# Patient Record
Sex: FEMALE | Race: WHITE | NOT HISPANIC OR LATINO | Employment: FULL TIME | URBAN - NONMETROPOLITAN AREA
[De-identification: names, ages, dates, MRNs, and addresses within clinical notes are randomized per-mention and may not be internally consistent; named-entity substitution may affect disease eponyms.]

---

## 2024-04-12 ENCOUNTER — TELEPHONE (OUTPATIENT)
Dept: CARDIOLOGY | Facility: CLINIC | Age: 29
End: 2024-04-12
Payer: COMMERCIAL

## 2024-04-12 NOTE — TELEPHONE ENCOUNTER
HUB TO RELAY    Called patient to see if she could come in a bit later in the day the day of her appt on 04/18-sometime after 9am per Andrade?    No answer-Left  for call back

## 2024-04-18 ENCOUNTER — OFFICE VISIT (OUTPATIENT)
Dept: CARDIOLOGY | Facility: CLINIC | Age: 29
End: 2024-04-18
Payer: COMMERCIAL

## 2024-04-18 ENCOUNTER — TELEPHONE (OUTPATIENT)
Dept: CARDIOLOGY | Facility: CLINIC | Age: 29
End: 2024-04-18
Payer: COMMERCIAL

## 2024-04-18 VITALS
DIASTOLIC BLOOD PRESSURE: 86 MMHG | WEIGHT: 194.6 LBS | HEART RATE: 87 BPM | HEIGHT: 63 IN | OXYGEN SATURATION: 99 % | BODY MASS INDEX: 34.48 KG/M2 | SYSTOLIC BLOOD PRESSURE: 135 MMHG

## 2024-04-18 DIAGNOSIS — I10 PRIMARY HYPERTENSION: Primary | ICD-10-CM

## 2024-04-18 PROCEDURE — 93000 ELECTROCARDIOGRAM COMPLETE: CPT | Performed by: PHYSICIAN ASSISTANT

## 2024-04-18 PROCEDURE — 99213 OFFICE O/P EST LOW 20 MIN: CPT | Performed by: PHYSICIAN ASSISTANT

## 2024-04-18 NOTE — TELEPHONE ENCOUNTER
Requested.       ----- Message from Andrade Wbeer PA-C sent at 4/18/2024 11:13 AM EDT -----  Can we get labs from pcp?

## 2024-04-18 NOTE — PROGRESS NOTES
Hanane Montes APRN  Radha CLINE  1995 04/18/2024    Patient Active Problem List   Diagnosis    Ureteral stone    Breast cyst, right    Primary hypertension       Dear Hanane Montes APRN:    Subjective     History of Present Illness:    Chief Complaint   Patient presents with    Follow-up     ROUTINE       Radha CLINE is a pleasant 29 y.o. female with a past medical history significant for essential hypertension and pulmonary hypertension although recent echo showed normal pulmonary pressures.  She comes in for annual follow-up    Ms. 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  Clinically she denies any concerning cardiac symptoms today such as chest pains, shortness of breath, palpitations, dizziness, or syncope.    Allergies   Allergen Reactions    Bactrim [Sulfamethoxazole-Trimethoprim] Unknown - High Severity   :      Current Outpatient Medications:     amLODIPine (NORVASC) 2.5 MG tablet, Take 1 tablet by mouth Daily., Disp: , Rfl:     B Complex Vitamins (Vitamin B Complex) tablet, Take  by mouth Daily., Disp: , Rfl:     Cholecalciferol (Vitamin D-3) 125 MCG (5000 UT) tablet, Take 1 tablet by mouth Daily., Disp: , Rfl:     hydrochlorothiazide (HYDRODIURIL) 25 MG tablet, Take 1 tablet by mouth Daily., Disp: , Rfl:     lansoprazole (PREVACID) 30 MG capsule, Take 1 capsule by mouth Daily., Disp: , Rfl:     potassium chloride (MICRO-K) 10 MEQ CR capsule, Take 2 capsules by mouth 2 (Two) Times a Day., Disp: , Rfl:     Prenatal MV & Min w/FA-DHA (PRENATAL GUMMIES PO), Take  by mouth Daily., Disp: , Rfl:     docusate sodium (COLACE) 250 MG capsule, Take 1 capsule by mouth 2 (Two) Times a Day. (Patient not taking: Reported on 4/18/2024), Disp: , Rfl:     methocarbamol (ROBAXIN) 500 MG tablet, Take 1 tablet by mouth As Needed for Muscle Spasms. (Patient not taking: Reported on 4/18/2024),  "Disp: , Rfl:     The following portions of the patient's history were reviewed and updated as appropriate: allergies, current medications, past family history, past medical history, past social history, past surgical history and problem list.    Social History     Tobacco Use    Smoking status: Never    Smokeless tobacco: Never   Vaping Use    Vaping status: Never Used   Substance Use Topics    Alcohol use: No    Drug use: No         Objective   Vitals:    04/18/24 1023   BP: 135/86   Pulse: 87   SpO2: 99%   Weight: 88.3 kg (194 lb 9.6 oz)   Height: 160 cm (63\")     Body mass index is 34.47 kg/m².    ROS    Constitutional:       General: Not in acute distress.     Appearance: Healthy appearance. Well-developed and not in distress. Not diaphoretic.   Eyes:      Conjunctiva/sclera: Conjunctivae normal.      Pupils: Pupils are equal, round, and reactive to light.   HENT:      Head: Normocephalic and atraumatic.   Neck:      Vascular: No carotid bruit or JVD.   Pulmonary:      Effort: Pulmonary effort is normal. No respiratory distress.      Breath sounds: Normal breath sounds.   Cardiovascular:      Normal rate. Regular rhythm.   Edema:     Peripheral edema absent.   Skin:     General: Skin is cool.   Neurological:      Mental Status: Alert, oriented to person, place, and time and oriented to person, place and time.         Lab Results   Component Value Date     01/11/2018    K 3.6 01/11/2018     01/11/2018    CO2 23.3 (L) 01/11/2018    BUN 9 01/11/2018    CREATININE 0.73 01/11/2018    GLUCOSE 115 (H) 01/11/2018    CALCIUM 9.5 01/11/2018    AST 22 01/11/2018    ALT 26 01/11/2018    ALKPHOS 78 01/11/2018     No results found for: \"CKTOTAL\"  Lab Results   Component Value Date    WBC 14.93 (H) 01/11/2018    HGB 14.3 01/11/2018    HCT 42.3 01/11/2018     01/11/2018     No results found for: \"INR\"  No results found for: \"MG\"  Lab Results   Component Value Date    TSH 3.750 04/05/2023      No results found " "for: \"BNP\"    During this visit the following were done:  Labs Reviewed []    Labs Ordered []    Radiology Reports Reviewed []    Radiology Ordered []    PCP Records Reviewed []    Referring Provider Records Reviewed []    ER Records Reviewed []    Hospital Records Reviewed []    History Obtained From Family []    Radiology Images Reviewed []    Other Reviewed []    Records Requested []         ECG 12 Lead    Date/Time: 4/18/2024 10:25 AM  Performed by: Andrade Weber PA-C    Authorized by: Andrade Weber PA-C  Comparison: compared with previous ECG   Similar to previous ECG  Rhythm: sinus rhythm  Conduction: conduction normal  ST Segments: ST segments normal    Clinical impression: normal ECG        Assessment & Plan    Diagnosis Plan   1. Primary hypertension                 Recommendations:  Essential hypertension  Well-controlled on current regimen with Norvasc and hydrochlorothiazide.  Will continue this    Return in about 1 year (around 4/18/2025).    As always, I appreciate very much the opportunity to participate in the cardiovascular care of your patients.      With Best Regards,    Andrade Weber PA-C          "

## 2025-04-17 ENCOUNTER — OFFICE VISIT (OUTPATIENT)
Dept: CARDIOLOGY | Facility: CLINIC | Age: 30
End: 2025-04-17
Payer: COMMERCIAL

## 2025-04-17 VITALS
SYSTOLIC BLOOD PRESSURE: 151 MMHG | OXYGEN SATURATION: 99 % | BODY MASS INDEX: 34.2 KG/M2 | DIASTOLIC BLOOD PRESSURE: 90 MMHG | HEIGHT: 63 IN | WEIGHT: 193 LBS | HEART RATE: 112 BPM

## 2025-04-17 DIAGNOSIS — I10 PRIMARY HYPERTENSION: Primary | ICD-10-CM

## 2025-04-17 DIAGNOSIS — R00.0 TACHYCARDIA: ICD-10-CM

## 2025-04-17 PROCEDURE — 93000 ELECTROCARDIOGRAM COMPLETE: CPT | Performed by: PHYSICIAN ASSISTANT

## 2025-04-17 PROCEDURE — 99214 OFFICE O/P EST MOD 30 MIN: CPT | Performed by: PHYSICIAN ASSISTANT

## 2025-04-17 RX ORDER — AMLODIPINE BESYLATE 5 MG/1
5 TABLET ORAL DAILY
Qty: 30 TABLET | Refills: 2 | Status: SHIPPED | OUTPATIENT
Start: 2025-04-17

## 2025-04-17 NOTE — PROGRESS NOTES
Hanane Montes APRN  Radha SHELLEY  1995 04/17/2025    Patient Active Problem List   Diagnosis    Ureteral stone    Breast cyst, right    Primary hypertension       Dear Hanane Montes APRN:    Subjective     History of Present Illness:    Chief Complaint   Patient presents with    Follow-up     ROUTINE       Radha SHELLEY is a pleasant 30 y.o. female with a past medical history significant for essential hypertension.  She denies history of diabetes mellitus or tobacco abuse.  She comes in today for cardiology follow-up.    Radha comes in today reporting that she has been doing well from symptom standpoint and has no complaints.  However she does wear her Apple Watch and reports that she has been finding her heart rate being fairly tachycardic at 220 bpm while at rest at work.  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  However if she was not wearing her Apple Watch she would otherwise be unaware of her tachycardia.  She denies any known arrhythmias, palpitations, or tachycardias that she has felt.  She also denies any chest pains, syncope, or near syncope.      Allergies   Allergen Reactions    Bactrim [Sulfamethoxazole-Trimethoprim] Unknown - High Severity   :      Current Outpatient Medications:     amLODIPine (NORVASC) 5 MG tablet, Take 1 tablet by mouth Daily., Disp: 30 tablet, Rfl: 2    Cholecalciferol (Vitamin D-3) 125 MCG (5000 UT) tablet, Take 1 tablet by mouth Daily., Disp: , Rfl:     docusate sodium (COLACE) 250 MG capsule, Take 1 capsule by mouth 2 (Two) Times a Day., Disp: , Rfl:     hydrochlorothiazide (HYDRODIURIL) 25 MG tablet, Take 1 tablet by mouth Daily., Disp: , Rfl:     lansoprazole (PREVACID) 30 MG capsule, Take 1 capsule by mouth Daily., Disp: , Rfl:     potassium chloride (MICRO-K) 10 MEQ CR capsule, Take 2 capsules by mouth 2 (Two) Times a Day., Disp: , Rfl:     The following portions of the patient's history were  "reviewed and updated as appropriate: allergies, current medications, past family history, past medical history, past social history, past surgical history and problem list.    Social History     Tobacco Use    Smoking status: Never    Smokeless tobacco: Never   Vaping Use    Vaping status: Never Used   Substance Use Topics    Alcohol use: No    Drug use: No     Objective   Vitals:    04/17/25 1532 04/17/25 1541   BP: 151/90    Pulse: 112 112   SpO2: 99%    Weight: 87.5 kg (193 lb)    Height: 160 cm (63\")      Body mass index is 34.19 kg/m².    ROS    Constitutional:       General: Not in acute distress.     Appearance: Healthy appearance. Well-developed and not in distress. Not diaphoretic.   Eyes:      Conjunctiva/sclera: Conjunctivae normal.      Pupils: Pupils are equal, round, and reactive to light.   HENT:      Head: Normocephalic and atraumatic.   Neck:      Vascular: No carotid bruit or JVD.   Pulmonary:      Effort: Pulmonary effort is normal. No respiratory distress.      Breath sounds: Normal breath sounds.   Cardiovascular:      Normal rate. Regular rhythm.   Edema:     Peripheral edema absent.   Skin:     General: Skin is cool.   Neurological:      Mental Status: Alert, oriented to person, place, and time and oriented to person, place and time.         Lab Results   Component Value Date     01/11/2018    K 3.6 01/11/2018     01/11/2018    CO2 23.3 (L) 01/11/2018    BUN 9 01/11/2018    CREATININE 0.73 01/11/2018    GLUCOSE 115 (H) 01/11/2018    CALCIUM 9.5 01/11/2018    AST 22 01/11/2018    ALT 26 01/11/2018    ALKPHOS 78 01/11/2018     No results found for: \"CKTOTAL\"  Lab Results   Component Value Date    WBC 14.93 (H) 01/11/2018    HGB 14.3 01/11/2018    HCT 42.3 01/11/2018     01/11/2018     No results found for: \"INR\"  No results found for: \"MG\"  Lab Results   Component Value Date    TSH 3.750 04/05/2023      No results found for: \"BNP\"    During this visit the following were " done:  Labs Reviewed []    Labs Ordered []    Radiology Reports Reviewed []    Radiology Ordered []    PCP Records Reviewed []    Referring Provider Records Reviewed []    ER Records Reviewed []    Hospital Records Reviewed []    History Obtained From Family []    Radiology Images Reviewed []    Other Reviewed []    Records Requested []         ECG 12 Lead    Date/Time: 4/17/2025 4:02 PM  Performed by: Andrade Weber PA-C    Authorized by: Andrade Weber PA-C  Comparison: compared with previous ECG   Similar to previous ECG  Rhythm: sinus rhythm  Conduction: right bundle branch block  Other findings: non-specific ST-T wave changes    Clinical impression: abnormal EKG          Assessment & Plan    Diagnosis Plan   1. Primary hypertension  Comprehensive Metabolic Panel    CBC (No Diff)    Magnesium    Thyroid Panel With TSH    C-reactive Protein    Lipid Panel      2. Tachycardia  Comprehensive Metabolic Panel    CBC (No Diff)    Magnesium    Thyroid Panel With TSH    C-reactive Protein    Lipid Panel               Recommendations:  Essential hypertension  Not at goal will increase amlodipine to 5 mg and encouraged her to check this at home regularly.  Tachycardia  Completely unaware clinically only knows because of her Apple watch.  Will rule out reversible causes with CMP, magnesium level, TSH, CBC, CRP.    No follow-ups on file.    As always, I appreciate very much the opportunity to participate in the cardiovascular care of your patients.      With Best Regards,    Andrade Weber PA-C

## 2025-04-18 ENCOUNTER — LAB (OUTPATIENT)
Dept: LAB | Facility: HOSPITAL | Age: 30
End: 2025-04-18
Payer: COMMERCIAL

## 2025-04-18 DIAGNOSIS — R00.0 TACHYCARDIA: ICD-10-CM

## 2025-04-18 DIAGNOSIS — I10 PRIMARY HYPERTENSION: ICD-10-CM

## 2025-04-18 PROCEDURE — 80050 GENERAL HEALTH PANEL: CPT

## 2025-04-18 PROCEDURE — 83735 ASSAY OF MAGNESIUM: CPT

## 2025-04-18 PROCEDURE — 86140 C-REACTIVE PROTEIN: CPT

## 2025-04-18 PROCEDURE — 84479 ASSAY OF THYROID (T3 OR T4): CPT

## 2025-04-18 PROCEDURE — 84436 ASSAY OF TOTAL THYROXINE: CPT

## 2025-04-18 PROCEDURE — 36415 COLL VENOUS BLD VENIPUNCTURE: CPT

## 2025-04-18 PROCEDURE — 80061 LIPID PANEL: CPT

## 2025-04-19 LAB
ALBUMIN SERPL-MCNC: 4.3 G/DL (ref 3.5–5.2)
ALBUMIN/GLOB SERPL: 1.4 G/DL
ALP SERPL-CCNC: 72 U/L (ref 39–117)
ALT SERPL W P-5'-P-CCNC: 24 U/L (ref 1–33)
ANION GAP SERPL CALCULATED.3IONS-SCNC: 11.2 MMOL/L (ref 5–15)
AST SERPL-CCNC: 21 U/L (ref 1–32)
BILIRUB SERPL-MCNC: 0.3 MG/DL (ref 0–1.2)
BUN SERPL-MCNC: 9 MG/DL (ref 6–20)
BUN/CREAT SERPL: 15.8 (ref 7–25)
CALCIUM SPEC-SCNC: 9.2 MG/DL (ref 8.6–10.5)
CHLORIDE SERPL-SCNC: 100 MMOL/L (ref 98–107)
CHOLEST SERPL-MCNC: 123 MG/DL (ref 0–200)
CO2 SERPL-SCNC: 24.8 MMOL/L (ref 22–29)
CREAT SERPL-MCNC: 0.57 MG/DL (ref 0.57–1)
CRP SERPL-MCNC: 0.69 MG/DL (ref 0–0.5)
DEPRECATED RDW RBC AUTO: 39.6 FL (ref 37–54)
EGFRCR SERPLBLD CKD-EPI 2021: 127.1 ML/MIN/1.73
ERYTHROCYTE [DISTWIDTH] IN BLOOD BY AUTOMATED COUNT: 13.2 % (ref 12.3–15.4)
GLOBULIN UR ELPH-MCNC: 3.1 GM/DL
GLUCOSE SERPL-MCNC: 122 MG/DL (ref 65–99)
HCT VFR BLD AUTO: 40.4 % (ref 34–46.6)
HDLC SERPL-MCNC: 38 MG/DL (ref 40–60)
HGB BLD-MCNC: 13.5 G/DL (ref 12–15.9)
LDLC SERPL CALC-MCNC: 68 MG/DL (ref 0–100)
LDLC/HDLC SERPL: 1.79 {RATIO}
MAGNESIUM SERPL-MCNC: 2.1 MG/DL (ref 1.6–2.6)
MCH RBC QN AUTO: 27.7 PG (ref 26.6–33)
MCHC RBC AUTO-ENTMCNC: 33.4 G/DL (ref 31.5–35.7)
MCV RBC AUTO: 82.8 FL (ref 79–97)
PLATELET # BLD AUTO: 343 10*3/MM3 (ref 140–450)
PMV BLD AUTO: 10.9 FL (ref 6–12)
POTASSIUM SERPL-SCNC: 4.1 MMOL/L (ref 3.5–5.2)
PROT SERPL-MCNC: 7.4 G/DL (ref 6–8.5)
RBC # BLD AUTO: 4.88 10*6/MM3 (ref 3.77–5.28)
SODIUM SERPL-SCNC: 136 MMOL/L (ref 136–145)
T-UPTAKE NFR SERPL: 1.08 TBI (ref 0.8–1.3)
T4 SERPL-MCNC: 6.69 MCG/DL (ref 4.5–11.7)
TRIGL SERPL-MCNC: 84 MG/DL (ref 0–150)
TSH SERPL DL<=0.05 MIU/L-ACNC: 2.46 UIU/ML (ref 0.27–4.2)
VLDLC SERPL-MCNC: 17 MG/DL (ref 5–40)
WBC NRBC COR # BLD AUTO: 7.73 10*3/MM3 (ref 3.4–10.8)

## 2025-07-11 ENCOUNTER — TELEPHONE (OUTPATIENT)
Dept: CARDIOLOGY | Facility: CLINIC | Age: 30
End: 2025-07-11
Payer: COMMERCIAL

## 2025-07-17 ENCOUNTER — OFFICE VISIT (OUTPATIENT)
Dept: CARDIOLOGY | Facility: CLINIC | Age: 30
End: 2025-07-17
Payer: COMMERCIAL

## 2025-07-17 VITALS
BODY MASS INDEX: 34.45 KG/M2 | OXYGEN SATURATION: 97 % | SYSTOLIC BLOOD PRESSURE: 144 MMHG | HEIGHT: 63 IN | DIASTOLIC BLOOD PRESSURE: 84 MMHG | HEART RATE: 97 BPM | WEIGHT: 194.4 LBS

## 2025-07-17 DIAGNOSIS — I10 PRIMARY HYPERTENSION: Primary | ICD-10-CM

## 2025-07-17 DIAGNOSIS — R00.2 PALPITATIONS: ICD-10-CM

## 2025-07-17 DIAGNOSIS — R73.03 PREDIABETES: ICD-10-CM

## 2025-07-17 PROCEDURE — 99214 OFFICE O/P EST MOD 30 MIN: CPT | Performed by: PHYSICIAN ASSISTANT

## 2025-07-17 RX ORDER — HYDROXYCHLOROQUINE SULFATE 200 MG/1
TABLET, FILM COATED ORAL
COMMUNITY
Start: 2025-05-19

## 2025-07-17 RX ORDER — ACETAMINOPHEN 500 MG
TABLET ORAL
COMMUNITY

## 2025-07-17 RX ORDER — TRIAMCINOLONE ACETONIDE 1 MG/G
CREAM TOPICAL
COMMUNITY
Start: 2025-07-10

## 2025-07-17 NOTE — PROGRESS NOTES
Angus Brasher PA  Radha SHELLEY  1995 07/17/2025    Patient Active Problem List   Diagnosis    Ureteral stone    Breast cyst, right    Primary hypertension       Dear Angus Brasher PA:    Subjective     History of Present Illness:    Chief Complaint   Patient presents with    Follow-up     ROUTINE       Radha SHELLEY is a pleasant 30 y.o. female with a past medical history significant for  History of Present Illness  The patient presents for evaluation of hypertension and prediabetes.    She has been on a summer break for the past 6 weeks, which has contributed to reduced stress levels and the prevention of palpitations. Home blood pressure monitoring has been showing, on average, BP above goal, with elevated readings noted during clinical visits, including a notably high reading at her gynecologist's office. The patient is contemplating pregnancy. She is actively managing her blood pressure and hemoglobin A1c levels in preparation for conception, incorporating regular walking exercises and adherence to a healthy diet. She believes that tea consumption aids in controlling her blood pressure. Her current antihypertensive regimen includes amlodipine 5 mg and hydrochlorothiazide (HCTZ), both of which are well-tolerated. She previously took nifedipine without experiencing adverse effects.    The patient is prediabetic, with a hemoglobin A1c of 6.2, and is managing her condition through dietary monitoring and regular physical activity.    She reports occasional palpitations but denies any episodes of syncope or falls.    SOCIAL HISTORY  Marital Status:   Exercise: Walking regularly  Diet: Monitoring intake  Coffee/Tea/Caffeine-containing Drinks: Drinking tea       Allergies   Allergen Reactions    Bactrim [Sulfamethoxazole-Trimethoprim] Unknown - High Severity   :      Current Outpatient Medications:     Cholecalciferol (Vitamin D-3) 125 MCG (5000 UT) tablet, Take 1 tablet by mouth Daily., Disp: ,  "Rfl:     hydrochlorothiazide (HYDRODIURIL) 25 MG tablet, Take 1 tablet by mouth Daily., Disp: , Rfl:     hydroxychloroquine (PLAQUENIL) 200 MG tablet, , Disp: , Rfl:     lansoprazole (PREVACID) 30 MG capsule, Take 1 capsule by mouth Daily., Disp: , Rfl:     potassium chloride (MICRO-K) 10 MEQ CR capsule, Take 2 capsules by mouth 2 (Two) Times a Day., Disp: , Rfl:     Probiotic, Lactobacillus, capsule, , Disp: , Rfl:     triamcinolone (KENALOG) 0.1 % cream, , Disp: , Rfl:     docusate sodium (COLACE) 250 MG capsule, Take 1 capsule by mouth 2 (Two) Times a Day., Disp: , Rfl:     NIFEdipine XL (ADALAT CC) 60 MG 24 hr tablet, Take 1 tablet by mouth Daily., Disp: 30 tablet, Rfl: 2    The following portions of the patient's history were reviewed and updated as appropriate: allergies, current medications, past family history, past medical history, past social history, past surgical history and problem list.    Social History     Tobacco Use    Smoking status: Never    Smokeless tobacco: Never   Vaping Use    Vaping status: Never Used   Substance Use Topics    Alcohol use: No    Drug use: No         Objective   Vitals:    07/17/25 0856   BP: 144/84   Pulse: 97   SpO2: 97%   Weight: 88.2 kg (194 lb 6.4 oz)   Height: 160 cm (63\")     Body mass index is 34.44 kg/m².    ROS    Constitutional:       General: Not in acute distress.     Appearance: Healthy appearance. Well-developed and not in distress. Not diaphoretic.   Eyes:      Conjunctiva/sclera: Conjunctivae normal.      Pupils: Pupils are equal, round, and reactive to light.   HENT:      Head: Normocephalic and atraumatic.   Neck:      Vascular: No carotid bruit or JVD.   Pulmonary:      Effort: Pulmonary effort is normal. No respiratory distress.      Breath sounds: Normal breath sounds.   Cardiovascular:      Normal rate. Regular rhythm.   Edema:     Peripheral edema absent.   Skin:     General: Skin is cool.   Neurological:      Mental Status: Alert, oriented to person, " "place, and time and oriented to person, place and time.         Lab Results   Component Value Date     04/18/2025    K 4.1 04/18/2025     04/18/2025    CO2 24.8 04/18/2025    BUN 9 04/18/2025    CREATININE 0.57 04/18/2025    GLUCOSE 122 (H) 04/18/2025    CALCIUM 9.2 04/18/2025    AST 21 04/18/2025    ALT 24 04/18/2025    ALKPHOS 72 04/18/2025     No results found for: \"CKTOTAL\"  Lab Results   Component Value Date    WBC 7.73 04/18/2025    HGB 13.5 04/18/2025    HCT 40.4 04/18/2025     04/18/2025     No results found for: \"INR\"  Lab Results   Component Value Date    MG 2.1 04/18/2025     Lab Results   Component Value Date    TSH 2.460 04/18/2025    TRIG 84 04/18/2025    HDL 38 (L) 04/18/2025    LDL 68 04/18/2025      No results found for: \"BNP\"    During this visit the following were done:  Labs Reviewed []    Labs Ordered []    Radiology Reports Reviewed []    Radiology Ordered []    PCP Records Reviewed []    Referring Provider Records Reviewed []    ER Records Reviewed []    Hospital Records Reviewed []    History Obtained From Family []    Radiology Images Reviewed []    Other Reviewed []    Records Requested []       Procedures    Assessment & Plan    Diagnosis Plan   1. Primary hypertension        2. Palpitations        3. Prediabetes                 Assessment & Plan  1. Hypertension:  - Blood pressure remains elevated at 144/84, despite amlodipine 5 mg and HCTZ.  - Advised to consume a potassium-rich diet, including fruits, green vegetables, coconut, beets, and flaxseed.  - Provided handout on correct blood pressure measurement techniques.  - Plan to switch to nifedipine 60 mg. If BP exceeds 130 after a week, contact office for potential increase to 90 mg.    2. Prediabetes:  - A1c is 6.2, indicating prediabetes.  - Encouraged to continue lifestyle modifications, including walking and dietary changes, to manage blood sugar levels.    3. Palpitations:  - Reports occasional palpitations " without significant issues like syncope or falls.  - Given age and infrequency of symptoms, no immediate intervention required.  - If palpitations become more frequent or severe, consider heart monitor.    Follow-up:  - Schedule follow-up for BP check in 3 to 4 weeks.      No follow-ups on file.    As always, I appreciate very much the opportunity to participate in the cardiovascular care of your patients.      With Best Regards,    Andrade Weber PA-C    Patient or patient representative verbalized consent for the use of Ambient Listening during the visit with  Andrade Weber PA-C for chart documentation. 7/17/2025  10:55 EDT

## 2025-07-21 RX ORDER — AMLODIPINE BESYLATE 5 MG/1
5 TABLET ORAL DAILY
Qty: 90 TABLET | OUTPATIENT
Start: 2025-07-21